# Patient Record
Sex: FEMALE | Race: WHITE | URBAN - METROPOLITAN AREA
[De-identification: names, ages, dates, MRNs, and addresses within clinical notes are randomized per-mention and may not be internally consistent; named-entity substitution may affect disease eponyms.]

---

## 2017-09-01 ENCOUNTER — APPOINTMENT (RX ONLY)
Dept: URBAN - METROPOLITAN AREA CLINIC 24 | Facility: CLINIC | Age: 37
Setting detail: DERMATOLOGY
End: 2017-09-01

## 2017-09-01 DIAGNOSIS — L90.5 SCAR CONDITIONS AND FIBROSIS OF SKIN: ICD-10-CM

## 2017-09-01 DIAGNOSIS — D22 MELANOCYTIC NEVI: ICD-10-CM

## 2017-09-01 DIAGNOSIS — L21.8 OTHER SEBORRHEIC DERMATITIS: ICD-10-CM

## 2017-09-01 PROBLEM — D22.5 MELANOCYTIC NEVI OF TRUNK: Status: ACTIVE | Noted: 2017-09-01

## 2017-09-01 PROBLEM — L85.3 XEROSIS CUTIS: Status: ACTIVE | Noted: 2017-09-01

## 2017-09-01 PROBLEM — D48.5 NEOPLASM OF UNCERTAIN BEHAVIOR OF SKIN: Status: ACTIVE | Noted: 2017-09-01

## 2017-09-01 PROBLEM — D22.0 MELANOCYTIC NEVI OF LIP: Status: ACTIVE | Noted: 2017-09-01

## 2017-09-01 PROBLEM — D22.39 MELANOCYTIC NEVI OF OTHER PARTS OF FACE: Status: ACTIVE | Noted: 2017-09-01

## 2017-09-01 PROCEDURE — ? OTHER

## 2017-09-01 PROCEDURE — ? PRESCRIPTION

## 2017-09-01 PROCEDURE — 99214 OFFICE O/P EST MOD 30 MIN: CPT

## 2017-09-01 PROCEDURE — ? COUNSELING

## 2017-09-01 RX ORDER — KETOCONAZOLE 20.5 MG/ML
SHAMPOO, SUSPENSION TOPICAL
Qty: 1 | Refills: 6 | Status: ERX | COMMUNITY
Start: 2017-09-01

## 2017-09-01 RX ORDER — KETOCONAZOLE 20 MG/G
CREAM TOPICAL BID
Qty: 1 | Refills: 3 | Status: ERX | COMMUNITY
Start: 2017-09-01

## 2017-09-01 RX ADMIN — KETOCONAZOLE: 20 CREAM TOPICAL at 15:18

## 2017-09-01 RX ADMIN — KETOCONAZOLE: 20.5 SHAMPOO, SUSPENSION TOPICAL at 15:18

## 2017-09-01 ASSESSMENT — LOCATION DETAILED DESCRIPTION DERM
LOCATION DETAILED: LEFT MEDIAL EYEBROW
LOCATION DETAILED: LEFT MEDIAL ZYGOMA
LOCATION DETAILED: EPIGASTRIC SKIN
LOCATION DETAILED: LEFT MEDIAL MALAR CHEEK
LOCATION DETAILED: LEFT CAVUM CONCHA
LOCATION DETAILED: LEFT UPPER CUTANEOUS LIP
LOCATION DETAILED: RIGHT KNEE
LOCATION DETAILED: LEFT CENTRAL ZYGOMA
LOCATION DETAILED: RIGHT MEDIAL UPPER BACK
LOCATION DETAILED: RIGHT MEDIAL EYEBROW
LOCATION DETAILED: MID-FRONTAL SCALP
LOCATION DETAILED: RIGHT CAVUM CONCHA

## 2017-09-01 ASSESSMENT — LOCATION ZONE DERM
LOCATION ZONE: SCALP
LOCATION ZONE: LIP
LOCATION ZONE: TRUNK
LOCATION ZONE: EAR
LOCATION ZONE: FACE
LOCATION ZONE: LEG

## 2017-09-01 ASSESSMENT — LOCATION SIMPLE DESCRIPTION DERM
LOCATION SIMPLE: LEFT EYEBROW
LOCATION SIMPLE: RIGHT EAR
LOCATION SIMPLE: ABDOMEN
LOCATION SIMPLE: RIGHT UPPER BACK
LOCATION SIMPLE: RIGHT EYEBROW
LOCATION SIMPLE: RIGHT KNEE
LOCATION SIMPLE: LEFT CHEEK
LOCATION SIMPLE: LEFT LIP
LOCATION SIMPLE: LEFT EAR
LOCATION SIMPLE: ANTERIOR SCALP
LOCATION SIMPLE: LEFT ZYGOMA

## 2017-09-01 NOTE — PROCEDURE: OTHER
Note Text (......Xxx Chief Complaint.): This diagnosis correlates with the
Other (Free Text): Recommended not to remove but can send a referral to plastics for consult to discuss pros and cons, if patient would like. She will let us know.
Detail Level: Zone
Other (Free Text): ACL in middle school

## 2017-09-01 NOTE — HPI: SKIN LESION
How Severe Is Your Skin Lesion?: mild
Has Your Skin Lesion Been Treated?: not been treated
Is This A New Presentation, Or A Follow-Up?: Moles
Additional History: Pt had moles taken off with Dr. Rincon at last visit. We wanted to see how scarring was going to do before removing more moles.

## 2021-11-20 LAB — MAMMOGRAPHY, EXTERNAL: NORMAL

## 2024-03-08 ENCOUNTER — OFFICE VISIT (OUTPATIENT)
Dept: FAMILY MEDICINE CLINIC | Age: 44
End: 2024-03-08
Payer: OTHER GOVERNMENT

## 2024-03-08 VITALS
HEART RATE: 55 BPM | OXYGEN SATURATION: 97 % | SYSTOLIC BLOOD PRESSURE: 98 MMHG | DIASTOLIC BLOOD PRESSURE: 72 MMHG | WEIGHT: 126.4 LBS | TEMPERATURE: 97.9 F | HEIGHT: 60 IN | BODY MASS INDEX: 24.81 KG/M2

## 2024-03-08 DIAGNOSIS — M54.2 NECK PAIN: Primary | ICD-10-CM

## 2024-03-08 DIAGNOSIS — Z13.220 SCREENING FOR CHOLESTEROL LEVEL: ICD-10-CM

## 2024-03-08 PROBLEM — R00.1 BRADYCARDIA: Status: ACTIVE | Noted: 2024-03-08

## 2024-03-08 PROBLEM — N92.1 MENOMETRORRHAGIA: Status: ACTIVE | Noted: 2024-03-08

## 2024-03-08 PROCEDURE — 99203 OFFICE O/P NEW LOW 30 MIN: CPT | Performed by: FAMILY MEDICINE

## 2024-03-08 SDOH — ECONOMIC STABILITY: HOUSING INSECURITY
IN THE LAST 12 MONTHS, WAS THERE A TIME WHEN YOU DID NOT HAVE A STEADY PLACE TO SLEEP OR SLEPT IN A SHELTER (INCLUDING NOW)?: NO

## 2024-03-08 SDOH — ECONOMIC STABILITY: INCOME INSECURITY: HOW HARD IS IT FOR YOU TO PAY FOR THE VERY BASICS LIKE FOOD, HOUSING, MEDICAL CARE, AND HEATING?: NOT HARD AT ALL

## 2024-03-08 SDOH — ECONOMIC STABILITY: FOOD INSECURITY: WITHIN THE PAST 12 MONTHS, YOU WORRIED THAT YOUR FOOD WOULD RUN OUT BEFORE YOU GOT MONEY TO BUY MORE.: NEVER TRUE

## 2024-03-08 SDOH — ECONOMIC STABILITY: FOOD INSECURITY: WITHIN THE PAST 12 MONTHS, THE FOOD YOU BOUGHT JUST DIDN'T LAST AND YOU DIDN'T HAVE MONEY TO GET MORE.: NEVER TRUE

## 2024-03-08 ASSESSMENT — PATIENT HEALTH QUESTIONNAIRE - PHQ9
SUM OF ALL RESPONSES TO PHQ9 QUESTIONS 1 & 2: 0
SUM OF ALL RESPONSES TO PHQ QUESTIONS 1-9: 0
SUM OF ALL RESPONSES TO PHQ QUESTIONS 1-9: 0
2. FEELING DOWN, DEPRESSED OR HOPELESS: 0
SUM OF ALL RESPONSES TO PHQ QUESTIONS 1-9: 0
SUM OF ALL RESPONSES TO PHQ QUESTIONS 1-9: 0
1. LITTLE INTEREST OR PLEASURE IN DOING THINGS: 0

## 2024-03-08 ASSESSMENT — LIFESTYLE VARIABLES
HOW OFTEN DO YOU HAVE A DRINK CONTAINING ALCOHOL: MONTHLY OR LESS
HOW MANY STANDARD DRINKS CONTAINING ALCOHOL DO YOU HAVE ON A TYPICAL DAY: 1 OR 2

## 2024-03-08 NOTE — PROGRESS NOTES
3/8/2024    Cassie Sutton is a 43 y.o. female here for:    Chief Complaint   Patient presents with    New Patient    Neck Pain        HPI:  Neck pain   - Started 5-6 years ago but worsening.   - Occurs weekly.  - Pain is constant.   - Describes pain as a tightness and burning.   - Radiates down RUE. Reports numbness and tingling in fingertips. Admits to some weakness, but not dropping things.   - Right-hand dominant.   - Better with stretching and traction. Patient is a Physical Therapist.   - Worse with bending and doing her job. Patient is a Physical Therapist.  - Pain is 6-7/10 on pain scale.     No current outpatient medications on file.     No current facility-administered medications for this visit.      Allergies   Allergen Reactions    Erythromycin Hives       Past Medical & Surgical History:      Diagnosis Date    Abnormal Papanicolaou smear of cervix     Bradycardia     since childhood, has been evaluated a few times in the past    Menometrorrhagia     Postpartum depression      Past Surgical History:   Procedure Laterality Date    ANTERIOR CRUCIATE LIGAMENT REPAIR Right      SECTION      x 2    COLPOSCOPY         Family History:      Problem Relation Age of Onset    Hypertension Mother     Diabetes Father     Lung Disease Maternal Grandmother         IPF    Cancer Maternal Grandfather     Dementia Paternal Grandmother     Heart Attack Paternal Grandfather     Breast Cancer Maternal Aunt     Cancer Maternal Uncle         testicular       Social History:  Social History     Tobacco Use    Smoking status: Some Days     Current packs/day: 0.00     Types: Cigarettes     Last attempt to quit: 2010     Years since quittin.1    Smokeless tobacco: Never   Substance Use Topics    Alcohol use: Not on file     Comment: monthly or less       BP Readings from Last 3 Encounters:   24 98/72       VS:  BP 98/72 (Site: Right Upper Arm, Position: Sitting, Cuff Size: Medium Adult)   Pulse 55

## 2024-12-03 ENCOUNTER — OFFICE VISIT (OUTPATIENT)
Dept: OBGYN | Age: 44
End: 2024-12-03
Payer: OTHER GOVERNMENT

## 2024-12-03 VITALS
TEMPERATURE: 98.1 F | HEART RATE: 62 BPM | SYSTOLIC BLOOD PRESSURE: 106 MMHG | OXYGEN SATURATION: 97 % | BODY MASS INDEX: 25.78 KG/M2 | WEIGHT: 132 LBS | DIASTOLIC BLOOD PRESSURE: 66 MMHG

## 2024-12-03 DIAGNOSIS — Z12.31 ENCOUNTER FOR SCREENING MAMMOGRAM FOR MALIGNANT NEOPLASM OF BREAST: ICD-10-CM

## 2024-12-03 DIAGNOSIS — Z01.419 ENCOUNTER FOR ANNUAL ROUTINE GYNECOLOGICAL EXAMINATION: Primary | ICD-10-CM

## 2024-12-03 PROCEDURE — 99386 PREV VISIT NEW AGE 40-64: CPT | Performed by: OBSTETRICS & GYNECOLOGY

## 2024-12-03 PROCEDURE — 99203 OFFICE O/P NEW LOW 30 MIN: CPT | Performed by: OBSTETRICS & GYNECOLOGY

## 2024-12-03 RX ORDER — NORETHINDRONE ACETATE AND ETHINYL ESTRADIOL 1.5-30(21)
1 KIT ORAL DAILY
Qty: 3 PACKET | Refills: 4 | Status: SHIPPED | OUTPATIENT
Start: 2024-12-03

## 2024-12-03 ASSESSMENT — ENCOUNTER SYMPTOMS
SHORTNESS OF BREATH: 0
DIARRHEA: 0
NAUSEA: 0
WHEEZING: 0
ABDOMINAL PAIN: 0
BLOOD IN STOOL: 0
VOMITING: 0
COUGH: 0
CONSTIPATION: 0

## 2024-12-03 NOTE — PROGRESS NOTES
New patient here to establish care  Patient has complaints of heavy periods for the last couple years  Just moved to the area a couple years ago, hasn't seen a gyn since living here.   Discharge instructions have been discussed with the patient. Patient advised to call our office with any questions or concerns.   Voiced understanding.    
or thyromegaly.      Vascular: No carotid bruit.   Cardiovascular:      Rate and Rhythm: Normal rate and regular rhythm.   Pulmonary:      Effort: Pulmonary effort is normal.      Breath sounds: Normal breath sounds.   Abdominal:      Palpations: Abdomen is soft. There is no mass.      Tenderness: There is no abdominal tenderness.      Hernia: No hernia is present.   Lymphadenopathy:      Cervical: No cervical adenopathy.      Upper Body:      Right upper body: No supraclavicular or axillary adenopathy.      Left upper body: No supraclavicular or axillary adenopathy.   Neurological:      Mental Status: She is alert.   Vitals reviewed.          Cassie was seen today for new patient.    Diagnoses and all orders for this visit:    Encounter for annual routine gynecological examination    Encounter for screening mammogram for malignant neoplasm of breast  -     MONSERRAT DIGITAL SCREEN W OR WO CAD BILATERAL; Future    Other orders  -     norethindrone-ethinyl estradiol-iron (LOESTRIN FE 1.5/30) 1.5-30 MG-MCG tablet; Take 1 tablet by mouth daily Skip placebo and start a new pack of pills    No Pap indicated today.  Colonoscopy or Cologuard at age 46.  Calcium vitamin D weightbearing exercise.  Reviewed perimenopausal changes options discussed for management of heavy menstrual bleeding.  She is most interested in restarting the pill she has no contraindications to doing so.  We reviewed risk benefits side effects decreased risk for endometrial and ovarian CA.  After the third pack if she is not liking the 1 chosen asked her to call so we can make adjustment      Return in about 1 year (around 12/3/2025) for annual.     Ada Huang,

## 2025-04-11 ENCOUNTER — OFFICE VISIT (OUTPATIENT)
Dept: FAMILY MEDICINE CLINIC | Age: 45
End: 2025-04-11
Payer: OTHER GOVERNMENT

## 2025-04-11 VITALS
WEIGHT: 132.2 LBS | OXYGEN SATURATION: 100 % | HEART RATE: 55 BPM | SYSTOLIC BLOOD PRESSURE: 94 MMHG | TEMPERATURE: 97.6 F | HEIGHT: 60 IN | DIASTOLIC BLOOD PRESSURE: 68 MMHG | RESPIRATION RATE: 20 BRPM | BODY MASS INDEX: 25.95 KG/M2

## 2025-04-11 DIAGNOSIS — R63.5 WEIGHT GAIN: ICD-10-CM

## 2025-04-11 DIAGNOSIS — L21.9 SEBORRHEIC DERMATITIS OF SCALP: ICD-10-CM

## 2025-04-11 DIAGNOSIS — N92.0 MENORRHAGIA WITH REGULAR CYCLE: ICD-10-CM

## 2025-04-11 DIAGNOSIS — Z00.00 ENCOUNTER FOR WELL ADULT EXAM WITHOUT ABNORMAL FINDINGS: ICD-10-CM

## 2025-04-11 DIAGNOSIS — Z00.00 WELL ADULT EXAM: Primary | ICD-10-CM

## 2025-04-11 LAB
ALBUMIN: 4.5 G/DL (ref 3.5–5.2)
ALP BLD-CCNC: 58 U/L (ref 35–104)
ALT SERPL-CCNC: 17 U/L (ref 0–32)
ANION GAP SERPL CALCULATED.3IONS-SCNC: 16 MMOL/L (ref 7–16)
AST SERPL-CCNC: 20 U/L (ref 0–31)
BASOPHILS ABSOLUTE: 0.04 K/UL (ref 0–0.2)
BASOPHILS RELATIVE PERCENT: 1 % (ref 0–2)
BILIRUB SERPL-MCNC: 0.5 MG/DL (ref 0–1.2)
BUN BLDV-MCNC: 14 MG/DL (ref 6–20)
CALCIUM SERPL-MCNC: 9.1 MG/DL (ref 8.6–10.2)
CHLORIDE BLD-SCNC: 103 MMOL/L (ref 98–107)
CHOLESTEROL, TOTAL: 169 MG/DL
CO2: 21 MMOL/L (ref 22–29)
CREAT SERPL-MCNC: 0.8 MG/DL (ref 0.5–1)
EOSINOPHILS ABSOLUTE: 0.04 K/UL (ref 0.05–0.5)
EOSINOPHILS RELATIVE PERCENT: 1 % (ref 0–6)
GFR, ESTIMATED: >90 ML/MIN/1.73M2
GLUCOSE BLD-MCNC: 86 MG/DL (ref 74–99)
HCT VFR BLD CALC: 43.5 % (ref 34–48)
HDLC SERPL-MCNC: 77 MG/DL
HEMOGLOBIN: 14.8 G/DL (ref 11.5–15.5)
IMMATURE GRANULOCYTES %: 0 % (ref 0–5)
IMMATURE GRANULOCYTES ABSOLUTE: <0.03 K/UL (ref 0–0.58)
LDL CHOLESTEROL: 81 MG/DL
LYMPHOCYTES ABSOLUTE: 1.75 K/UL (ref 1.5–4)
LYMPHOCYTES RELATIVE PERCENT: 23 % (ref 20–42)
MCH RBC QN AUTO: 32.1 PG (ref 26–35)
MCHC RBC AUTO-ENTMCNC: 34 G/DL (ref 32–34.5)
MCV RBC AUTO: 94.4 FL (ref 80–99.9)
MONOCYTES ABSOLUTE: 0.43 K/UL (ref 0.1–0.95)
MONOCYTES RELATIVE PERCENT: 6 % (ref 2–12)
NEUTROPHILS ABSOLUTE: 5.35 K/UL (ref 1.8–7.3)
NEUTROPHILS RELATIVE PERCENT: 70 % (ref 43–80)
PDW BLD-RTO: 11.9 % (ref 11.5–15)
PLATELET # BLD: 284 K/UL (ref 130–450)
PMV BLD AUTO: 11.1 FL (ref 7–12)
POTASSIUM SERPL-SCNC: 4.2 MMOL/L (ref 3.5–5)
RBC # BLD: 4.61 M/UL (ref 3.5–5.5)
SODIUM BLD-SCNC: 140 MMOL/L (ref 132–146)
TOTAL PROTEIN: 7.2 G/DL (ref 6.4–8.3)
TRIGL SERPL-MCNC: 56 MG/DL
TSH SERPL DL<=0.05 MIU/L-ACNC: 1.1 UIU/ML (ref 0.27–4.2)
VLDLC SERPL CALC-MCNC: 11 MG/DL
WBC # BLD: 7.6 K/UL (ref 4.5–11.5)

## 2025-04-11 PROCEDURE — 99396 PREV VISIT EST AGE 40-64: CPT | Performed by: FAMILY MEDICINE

## 2025-04-11 RX ORDER — KETOCONAZOLE 20 MG/ML
SHAMPOO, SUSPENSION TOPICAL
Qty: 120 ML | Refills: 2 | Status: SHIPPED | OUTPATIENT
Start: 2025-04-11

## 2025-04-11 SDOH — ECONOMIC STABILITY: FOOD INSECURITY: WITHIN THE PAST 12 MONTHS, THE FOOD YOU BOUGHT JUST DIDN'T LAST AND YOU DIDN'T HAVE MONEY TO GET MORE.: NEVER TRUE

## 2025-04-11 SDOH — ECONOMIC STABILITY: FOOD INSECURITY: WITHIN THE PAST 12 MONTHS, YOU WORRIED THAT YOUR FOOD WOULD RUN OUT BEFORE YOU GOT MONEY TO BUY MORE.: NEVER TRUE

## 2025-04-11 ASSESSMENT — PATIENT HEALTH QUESTIONNAIRE - PHQ9
8. MOVING OR SPEAKING SO SLOWLY THAT OTHER PEOPLE COULD HAVE NOTICED. OR THE OPPOSITE, BEING SO FIGETY OR RESTLESS THAT YOU HAVE BEEN MOVING AROUND A LOT MORE THAN USUAL: NOT AT ALL
SUM OF ALL RESPONSES TO PHQ QUESTIONS 1-9: 2
6. FEELING BAD ABOUT YOURSELF - OR THAT YOU ARE A FAILURE OR HAVE LET YOURSELF OR YOUR FAMILY DOWN: NOT AT ALL
4. FEELING TIRED OR HAVING LITTLE ENERGY: SEVERAL DAYS
SUM OF ALL RESPONSES TO PHQ QUESTIONS 1-9: 2
1. LITTLE INTEREST OR PLEASURE IN DOING THINGS: NOT AT ALL
7. TROUBLE CONCENTRATING ON THINGS, SUCH AS READING THE NEWSPAPER OR WATCHING TELEVISION: NOT AT ALL
SUM OF ALL RESPONSES TO PHQ QUESTIONS 1-9: 2
3. TROUBLE FALLING OR STAYING ASLEEP: SEVERAL DAYS
2. FEELING DOWN, DEPRESSED OR HOPELESS: NOT AT ALL
4. FEELING TIRED OR HAVING LITTLE ENERGY: SEVERAL DAYS
10. IF YOU CHECKED OFF ANY PROBLEMS, HOW DIFFICULT HAVE THESE PROBLEMS MADE IT FOR YOU TO DO YOUR WORK, TAKE CARE OF THINGS AT HOME, OR GET ALONG WITH OTHER PEOPLE: NOT DIFFICULT AT ALL
6. FEELING BAD ABOUT YOURSELF - OR THAT YOU ARE A FAILURE OR HAVE LET YOURSELF OR YOUR FAMILY DOWN: NOT AT ALL
SUM OF ALL RESPONSES TO PHQ QUESTIONS 1-9: 2
9. THOUGHTS THAT YOU WOULD BE BETTER OFF DEAD, OR OF HURTING YOURSELF: NOT AT ALL
5. POOR APPETITE OR OVEREATING: NOT AT ALL
9. THOUGHTS THAT YOU WOULD BE BETTER OFF DEAD, OR OF HURTING YOURSELF: NOT AT ALL
5. POOR APPETITE OR OVEREATING: NOT AT ALL
3. TROUBLE FALLING OR STAYING ASLEEP: SEVERAL DAYS
7. TROUBLE CONCENTRATING ON THINGS, SUCH AS READING THE NEWSPAPER OR WATCHING TELEVISION: NOT AT ALL
1. LITTLE INTEREST OR PLEASURE IN DOING THINGS: NOT AT ALL
2. FEELING DOWN, DEPRESSED OR HOPELESS: NOT AT ALL
8. MOVING OR SPEAKING SO SLOWLY THAT OTHER PEOPLE COULD HAVE NOTICED. OR THE OPPOSITE - BEING SO FIDGETY OR RESTLESS THAT YOU HAVE BEEN MOVING AROUND A LOT MORE THAN USUAL: NOT AT ALL
SUM OF ALL RESPONSES TO PHQ QUESTIONS 1-9: 2
10. IF YOU CHECKED OFF ANY PROBLEMS, HOW DIFFICULT HAVE THESE PROBLEMS MADE IT FOR YOU TO DO YOUR WORK, TAKE CARE OF THINGS AT HOME, OR GET ALONG WITH OTHER PEOPLE: NOT DIFFICULT AT ALL

## 2025-04-11 ASSESSMENT — ENCOUNTER SYMPTOMS
VOMITING: 0
EYE PAIN: 0
COUGH: 0
WHEEZING: 0
TROUBLE SWALLOWING: 0
DIARRHEA: 0
ABDOMINAL PAIN: 0
COLOR CHANGE: 0
SORE THROAT: 0
BLOOD IN STOOL: 0
SHORTNESS OF BREATH: 0
CONSTIPATION: 0
SINUS PRESSURE: 0
SINUS PAIN: 0
NAUSEA: 0

## 2025-04-11 NOTE — PROGRESS NOTES
Judgment: Judgment normal.             Personalized Preventive Plan  Current Health Maintenance Status  Immunization History   Administered Date(s) Administered    COVID-19, PFIZER PURPLE top, DILUTE for use, (age 12 y+), 30mcg/0.3mL 02/22/2021, 03/12/2021, 10/26/2021    Influenza Virus Vaccine 01/01/2013, 10/05/2015, 10/31/2016, 09/18/2017, 10/19/2017, 10/10/2018, 10/01/2019    PPD Test 01/01/2012    TDaP, ADACEL (age 10y-64y), BOOSTRIX (age 10y+), IM, 0.5mL 02/22/2012, 01/17/2015, 07/12/2016    Tst, Unspecified Formulation 01/01/2012        Health Maintenance Due   Topic Date Due    Varicella vaccine (1 of 2 - 13+ 2-dose series) Never done    Hepatitis B vaccine (1 of 3 - 19+ 3-dose series) Never done    Diabetes screen  Never done    Cervical cancer screen  03/24/2017    Lipids  Never done    Breast cancer screen  11/20/2023    COVID-19 Vaccine (4 - 2024-25 season) 09/01/2024      Recommendations for Preventive Services Due: see orders and patient instructions/AVS.    Electronically signed by Cheryl Mcgill DO on 4/11/2025 at 10:46 AM

## 2025-04-14 ENCOUNTER — RESULTS FOLLOW-UP (OUTPATIENT)
Dept: FAMILY MEDICINE CLINIC | Age: 45
End: 2025-04-14

## 2025-04-25 ENCOUNTER — HOSPITAL ENCOUNTER (OUTPATIENT)
Dept: MAMMOGRAPHY | Age: 45
Discharge: HOME | End: 2025-04-27
Payer: OTHER GOVERNMENT

## 2025-04-25 VITALS — HEIGHT: 60 IN | BODY MASS INDEX: 25.13 KG/M2 | WEIGHT: 128 LBS

## 2025-04-25 DIAGNOSIS — Z12.31 ENCOUNTER FOR SCREENING MAMMOGRAM FOR MALIGNANT NEOPLASM OF BREAST: ICD-10-CM

## 2025-04-25 PROCEDURE — 77063 BREAST TOMOSYNTHESIS BI: CPT

## 2025-05-14 ENCOUNTER — RESULTS FOLLOW-UP (OUTPATIENT)
Dept: OBGYN | Age: 45
End: 2025-05-14